# Patient Record
Sex: FEMALE | Race: WHITE | HISPANIC OR LATINO | Employment: FULL TIME
[De-identification: names, ages, dates, MRNs, and addresses within clinical notes are randomized per-mention and may not be internally consistent; named-entity substitution may affect disease eponyms.]

---

## 2023-10-22 ENCOUNTER — HEALTH MAINTENANCE LETTER (OUTPATIENT)
Age: 23
End: 2023-10-22

## 2023-10-22 ENCOUNTER — HOSPITAL ENCOUNTER (EMERGENCY)
Facility: CLINIC | Age: 23
Discharge: HOME | End: 2023-10-22
Attending: STUDENT IN AN ORGANIZED HEALTH CARE EDUCATION/TRAINING PROGRAM | Admitting: STUDENT IN AN ORGANIZED HEALTH CARE EDUCATION/TRAINING PROGRAM
Payer: COMMERCIAL

## 2023-10-22 VITALS
HEIGHT: 62 IN | DIASTOLIC BLOOD PRESSURE: 70 MMHG | BODY MASS INDEX: 39.75 KG/M2 | RESPIRATION RATE: 20 BRPM | SYSTOLIC BLOOD PRESSURE: 116 MMHG | WEIGHT: 216 LBS | HEART RATE: 79 BPM | TEMPERATURE: 98.6 F | OXYGEN SATURATION: 100 %

## 2023-10-22 DIAGNOSIS — R55 SYNCOPE, UNSPECIFIED SYNCOPE TYPE: ICD-10-CM

## 2023-10-22 LAB
ANION GAP SERPL CALCULATED.3IONS-SCNC: 9 MMOL/L (ref 7–15)
ATRIAL RATE - MUSE: 79 BPM
BASO+EOS+MONOS # BLD AUTO: ABNORMAL 10*3/UL
BASO+EOS+MONOS NFR BLD AUTO: ABNORMAL %
BASOPHILS # BLD AUTO: 0 10E3/UL (ref 0–0.2)
BASOPHILS NFR BLD AUTO: 0 %
BUN SERPL-MCNC: 9.9 MG/DL (ref 6–20)
CALCIUM SERPL-MCNC: 9.6 MG/DL (ref 8.6–10)
CHLORIDE SERPL-SCNC: 104 MMOL/L (ref 98–107)
CREAT SERPL-MCNC: 0.54 MG/DL (ref 0.51–0.95)
DEPRECATED HCO3 PLAS-SCNC: 24 MMOL/L (ref 22–29)
DIASTOLIC BLOOD PRESSURE - MUSE: 103 MMHG
EGFRCR SERPLBLD CKD-EPI 2021: >90 ML/MIN/1.73M2
EOSINOPHIL # BLD AUTO: 0.3 10E3/UL (ref 0–0.7)
EOSINOPHIL NFR BLD AUTO: 3 %
ERYTHROCYTE [DISTWIDTH] IN BLOOD BY AUTOMATED COUNT: 18.7 % (ref 10–15)
GLUCOSE SERPL-MCNC: 108 MG/DL (ref 70–99)
HCT VFR BLD AUTO: 38 % (ref 35–47)
HGB BLD-MCNC: 11.9 G/DL (ref 11.7–15.7)
IMM GRANULOCYTES # BLD: 0.1 10E3/UL
IMM GRANULOCYTES NFR BLD: 1 %
INTERPRETATION ECG - MUSE: NORMAL
LYMPHOCYTES # BLD AUTO: 1.4 10E3/UL (ref 0.8–5.3)
LYMPHOCYTES NFR BLD AUTO: 14 %
MCH RBC QN AUTO: 21.7 PG (ref 26.5–33)
MCHC RBC AUTO-ENTMCNC: 31.3 G/DL (ref 31.5–36.5)
MCV RBC AUTO: 69 FL (ref 78–100)
MONOCYTES # BLD AUTO: 0.8 10E3/UL (ref 0–1.3)
MONOCYTES NFR BLD AUTO: 7 %
NEUTROPHILS # BLD AUTO: 7.5 10E3/UL (ref 1.6–8.3)
NEUTROPHILS NFR BLD AUTO: 75 %
NRBC # BLD AUTO: 0 10E3/UL
NRBC BLD AUTO-RTO: 0 /100
P AXIS - MUSE: 17 DEGREES
PLATELET # BLD AUTO: 357 10E3/UL (ref 150–450)
POTASSIUM SERPL-SCNC: 4 MMOL/L (ref 3.4–5.3)
PR INTERVAL - MUSE: 144 MS
QRS DURATION - MUSE: 84 MS
QT - MUSE: 370 MS
QTC - MUSE: 424 MS
R AXIS - MUSE: 36 DEGREES
RBC # BLD AUTO: 5.48 10E6/UL (ref 3.8–5.2)
SODIUM SERPL-SCNC: 137 MMOL/L (ref 135–145)
SYSTOLIC BLOOD PRESSURE - MUSE: 141 MMHG
T AXIS - MUSE: 28 DEGREES
TROPONIN T SERPL HS-MCNC: <6 NG/L
VENTRICULAR RATE- MUSE: 79 BPM
WBC # BLD AUTO: 10.1 10E3/UL (ref 4–11)

## 2023-10-22 PROCEDURE — 80048 BASIC METABOLIC PNL TOTAL CA: CPT | Performed by: STUDENT IN AN ORGANIZED HEALTH CARE EDUCATION/TRAINING PROGRAM

## 2023-10-22 PROCEDURE — 99284 EMERGENCY DEPT VISIT MOD MDM: CPT

## 2023-10-22 PROCEDURE — 36415 COLL VENOUS BLD VENIPUNCTURE: CPT | Performed by: STUDENT IN AN ORGANIZED HEALTH CARE EDUCATION/TRAINING PROGRAM

## 2023-10-22 PROCEDURE — 85025 COMPLETE CBC W/AUTO DIFF WBC: CPT | Performed by: STUDENT IN AN ORGANIZED HEALTH CARE EDUCATION/TRAINING PROGRAM

## 2023-10-22 PROCEDURE — 84484 ASSAY OF TROPONIN QUANT: CPT | Performed by: STUDENT IN AN ORGANIZED HEALTH CARE EDUCATION/TRAINING PROGRAM

## 2023-10-22 PROCEDURE — 93005 ELECTROCARDIOGRAM TRACING: CPT | Performed by: STUDENT IN AN ORGANIZED HEALTH CARE EDUCATION/TRAINING PROGRAM

## 2023-10-22 NOTE — DISCHARGE INSTRUCTIONS
Your blood work today was reassuring.  EKG looked normal.  Suspect your fainting event was from stress/anxiety.    We also placed an electronic referral for the rapid access clinic which is our cardiology clinic.  I would like you to see them because of the multiple episodes of fainting.    Return for any fainting episodes, worsening symptoms or any other concerning symptoms.

## 2023-10-22 NOTE — ED NOTES
Patient discharged to home at this time.  Discharge instructions reviewed and provided to patient.  All instructions reviewed and questions answered.  Medication education provided for all new medications.  Patient stable upon discharge.  Regina Bolanos RN on 10/22/2023 at 2:45 AM

## 2023-10-22 NOTE — ED PROVIDER NOTES
Emergency Department Encounter         FINAL IMPRESSION:  Syncope        ED COURSE AND MEDICAL DECISION MAKING       ED Course as of 10/22/23 0244   Sun Oct 22, 2023   0200 I met with the patient, obtained history, performed physical exam, and discussed ED plan.   0209 Obese 23-year-old history of low iron per patient, here after syncopal event.  Patient states he was arguing with her  tonight and on the couch she started feeling panicked, stressed and then had a fainting spell.  Patient states it is happened before, and was told she had low iron.  Has been doing well this week otherwise.  States her menstrual cycle has been heavier than normal.  Intermittent abdominal cramping.  No chest pain, headache or shortness of breath or palpitations preceding the event.  No fevers chills nausea vomiting.  No tongue biting, seizure activity or bowel or bladder incontinence.  On arrival here she looks well.  Vitals are stable.  Heart and lungs are normal.  Neurologically grossly intact.  Patient really has no complaints right now.  I suspect vasovagal syncope.  Will obtain basic labs and EKG and if normal slightly discharge home.   0213 EKG is sinus rhythm 79, no signs of acute ischemia, no inversions no depressions no STEMI criteria, no malignant arrhythmias including ARVD, WPW, Brugada, QTc is 424.  No old EKGs for comparison.   0226 If patient's labs and work-up are reasonable, we will have her follow-up as an outpatient with rapid access for most likely Holter monitor placement.     -Labs reassuring.  EKG as above.  Plan for discharge home                     Medical Decision Making    History:  Supplemental history from: Documented in chart, if applicable  External Record(s) reviewed: Documented in chart, if applicable.    Work Up:  Chart documentation includes differential considered and any EKGs or imaging independently interpreted by provider, where specified.  In additional to work up documented, I  considered the following work up: Documented in chart, if applicable.    External consultation:  Discussion of management with another provider: Documented in chart, if applicable    Complicating factors:  Care impacted by chronic illness: N/A  Care affected by social determinants of health: N/A    Disposition considerations: Discharge. No recommendations on prescription strength medication(s). See documentation for any additional details.                Critical Care     Performed by: Jamal Marie or    Authorized by: Jamal Marie  Total critical care time:  minutes  Critical care was necessary to treat or prevent imminent or life-threatening deterioration of the following conditions:   Critical care was time spent personally by me on the following activities: development of treatment plan with patient or surrogate, discussions with consultants, examination of patient, evaluation of patient's response to treatment, obtaining history from patient or surrogate, ordering and performing treatments and interventions, ordering and review of laboratory studies, ordering and review of radiographic studies, re-evaluation of patient's condition and monitoring for potential decompensation.  Critical care time was exclusive of separately billable procedures and treating other patients.'    At the conclusion of the encounter I discussed the results of all the tests and the disposition. The questions were answered. The patient or family acknowledged understanding and was agreeable with the care plan.        MEDICATIONS GIVEN IN THE EMERGENCY DEPARTMENT:  Medications - No data to display    NEW PRESCRIPTIONS STARTED AT TODAY'S ED VISIT:  New Prescriptions    No medications on file       HPI     Patient information obtained from: patient    Use of : N/A    Angiekj Buenrostro is a 23 year old female with no pertinent history who presents to this ED via walk-in for evaluation of syncope. At around 2100, the patient was  "arguing with her  on the couch. She was feeling stressed, panicked, felt chest tightness, and she had a syncopal episode for 5 minutes. This has been happening once per month for the past 3 months usually when the patient is on her period. The last time this happened, the patient went to the ED and they said that she had low iron. She took the medications they gave her but they did not help. The patient is currently on her menstrual cycle and she is bleeding more than last month. She also has abdominal pain, cramps, and a mild headache. Denies biting tongue, chest pain, shortness of breath, palpitations, urinary or stool incontinence, or any other complaints at this time.          MEDICAL HISTORY     No past medical history on file.    No past surgical history on file.         No current outpatient medications on file.          PHYSICAL EXAM     BP (!) 152/101   Pulse 90   Temp 98.6  F (37  C) (Oral)   Resp 20   Ht 1.575 m (5' 2\")   Wt 98 kg (216 lb)   LMP 10/21/2023   SpO2 99%   BMI 39.51 kg/m        PHYSICAL EXAM:     General: Patient appears well, nontoxic, comfortable  HEENT: Moist mucous membranes,  No head trauma.    Cardiovascular: Normal rate, normal rhythm, no extremity edema.  No appreciable murmur.  Respiratory: No signs of respiratory distress, lungs are clear to auscultation bilaterally with no wheezes rhonchi or rales.  Abdominal: Soft, nontender, nondistended, no palpable masses, no guarding, no rebound  Musculoskeletal: Full range of motion of joints, no deformities appreciated.  Neurological: Alert and oriented, grossly neurologically intact.  Psychological: Normal affect and mood.  Integument: No rashes appreciated          RESULTS       Labs Ordered and Resulted from Time of ED Arrival to Time of ED Departure - No data to display    No orders to display         PROCEDURES:  Procedures:  Procedures       I, Star Garber am serving as a scribe to document services personally performed " by Jamal Marie DO, based on my observations and the provider's statements to me.  I, Jamal Marie DO, attest that Star Garber is acting in a scribe capacity, has observed my performance of the services and has documented them in accordance with my direction.    Jamal Marie DO  Emergency Medicine  [facility] EMERGENCY ROOM       Cynthia, Jamal Delacruz DO  10/22/23 0246

## 2023-10-22 NOTE — ED TRIAGE NOTES
Around 2100 tonight, pt was seated on the couch and was arguing with her  and felt stressed and panicked. Pt had syncopal episode for a few minutes. Pt is on her menstrual cycles and states she has heavy flow normally on her cycle with previous syncopal episodes. Was seen previously in the ED for this and was told she had low iron and was given supplement to take.      Triage Assessment (Adult)       Row Name 10/22/23 0200          Triage Assessment    Airway WDL WDL        Respiratory WDL    Respiratory WDL WDL        Skin Circulation/Temperature WDL    Skin Circulation/Temperature WDL WDL        Cardiac WDL    Cardiac WDL WDL        Peripheral/Neurovascular WDL    Peripheral Neurovascular WDL WDL        Cognitive/Neuro/Behavioral WDL    Cognitive/Neuro/Behavioral WDL WDL

## 2023-11-03 ENCOUNTER — OFFICE VISIT (OUTPATIENT)
Dept: CARDIOLOGY | Facility: CLINIC | Age: 23
End: 2023-11-03
Attending: STUDENT IN AN ORGANIZED HEALTH CARE EDUCATION/TRAINING PROGRAM
Payer: COMMERCIAL

## 2023-11-03 VITALS
HEART RATE: 109 BPM | HEIGHT: 62 IN | DIASTOLIC BLOOD PRESSURE: 84 MMHG | RESPIRATION RATE: 20 BRPM | SYSTOLIC BLOOD PRESSURE: 122 MMHG | WEIGHT: 222 LBS | OXYGEN SATURATION: 98 % | BODY MASS INDEX: 40.85 KG/M2

## 2023-11-03 DIAGNOSIS — R55 SYNCOPE, UNSPECIFIED SYNCOPE TYPE: ICD-10-CM

## 2023-11-03 PROCEDURE — 99204 OFFICE O/P NEW MOD 45 MIN: CPT | Performed by: INTERNAL MEDICINE

## 2023-11-03 RX ORDER — FERROUS SULFATE 325(65) MG
1 TABLET ORAL 2 TIMES DAILY
COMMUNITY
Start: 2023-08-20

## 2023-11-03 NOTE — PATIENT INSTRUCTIONS
It is nice to meet you today!    As discussed, let's get a bit more of a work up with an echo and a Holter monitor. If both are unremarkable, as long as you're feeling better, may not need further w/up    Discussed need for preventative measures, including adequate hydration, allowing time for equilibration w/ positional changes

## 2023-11-03 NOTE — PROGRESS NOTES
HEART CARE ENCOUNTER CONSULTATON NOTE      [facility]  [phone]      Assessment/Recommendations   Assessment/Plan: 23F w/ no prior medical history here for w/up after an ED visit for syncope.    # Syncope - presumed neurocardiogenic (I.e. vasovagal) but given high risk presentation requiring ED evaluation, unclear prodrome  would like to do a work up with an echo and a Holter monitor. If both are unremarkable, as long as she is feeling better, may not need further w/up  - discussed need for preventative measures, including adequate hydration, allowing time for equilibration w/ positional changes        History of Present Illness/Subjective    HPI: Angie Buenrostro is a 23 year old female w/ no prior medical history here for w/up after an ED visit for syncope.    She was seen about a week ago after a recurrent syncopal episode while arguing w/ her . She tells me she felt sweaty, anxious, w/ some chest discomfort, and nausea. She was able to walk over to the couch and sit down, and according to her  she was otu for ~5 mins. She has had similar events a few times over the past 3 mos, always w/ a prodrome, and always when she is on her period.  EKG in ED w/ NSR at 79, no acute ischemic changes. Labs largely significant for mild anemia H/H 11/9/38 and microcytosis; she is on iron supplementation for iron deficiency anemia.   She feels that she gets some anxiety attacks w/ L side pain and SOB occasionally.  No CP/SOB/REYNOSO/orthopnea/PND/edema/N/V/D/F/C/wt.changes.    , not a smoker or drinker, works for [employer].    Recent Echocardiogram Results:  None    Recent Coronary Angiogram Results:  None       Physical Examination  Review of Systems   Vitals: LMP 10/21/2023   BMI= There is no height or weight on file to calculate BMI.  Wt Readings from Last 3 Encounters:   10/22/23 98 kg (216 lb)       General Appearance:   no distress, normal body habitus   ENT/Mouth:  membranes moist, no oral lesions or bleeding gums.      EYES:  no scleral icterus, normal conjunctivae   Neck: no carotid bruits or thyromegaly   Chest/Lungs:   lungs are clear to auscultation, no rales or wheezing, no sternal scar, equal chest wall expansion    Cardiovascular:   Regular. Normal first and second heart sounds with no murmurs, rubs, or gallops; the carotid, radial and posterior tibial pulses are intact, Jugular venous pressure 6, no edema bilaterally    Abdomen:  no organomegaly, masses, bruits, or tenderness; bowel sounds are present   Extremities: no cyanosis or clubbing   Skin: no xanthelasma, warm.    Neurologic: normal  bilateral, no tremors     Psychiatric: alert and oriented x3, calm        Please refer above for cardiac ROS details.        Medical History  Surgical History Family History Social History   No past medical history on file.  No past surgical history on file.  No family history on file.     Social History     Socioeconomic History    Marital status: Single     Spouse name: Not on file    Number of children: Not on file    Years of education: Not on file    Highest education level: Not on file   Occupational History    Not on file   Tobacco Use    Smoking status: Not on file    Smokeless tobacco: Not on file   Substance and Sexual Activity    Alcohol use: Not on file    Drug use: Not on file    Sexual activity: Not on file   Other Topics Concern    Not on file   Social History Narrative    Not on file     Social Determinants of Health     Financial Resource Strain: Not on file   Food Insecurity: Not on file   Transportation Needs: Not on file   Physical Activity: Not on file   Stress: Not on file   Social Connections: Not on file   Interpersonal Safety: Not on file   Housing Stability: Not on file           Medications  Allergies   No current outpatient medications on file.     No Known Allergies       Lab Results    Chemistry/lipid CBC Cardiac Enzymes/BNP/TSH/INR   No results  "for input(s): \"CHOL\", \"HDL\", \"LDL\", \"TRIG\", \"CHOLHDLRATIO\" in the last 00744 hours.  No results for input(s): \"LDL\" in the last 94835 hours.  Recent Labs   Lab Test 10/22/23  0213      POTASSIUM 4.0   CHLORIDE 104   CO2 24   *   BUN 9.9   CR 0.54   GFRESTIMATED >90   LUCRECIA 9.6     Recent Labs   Lab Test 10/22/23  0213   CR 0.54     No results for input(s): \"A1C\" in the last 30798 hours.       Recent Labs   Lab Test 10/22/23  0213   WBC 10.1   HGB 11.9   HCT 38.0   MCV 69*        Recent Labs   Lab Test 10/22/23  0213   HGB 11.9    No results for input(s): \"TROPONINI\" in the last 29558 hours.  No results for input(s): \"BNP\", \"NTBNPI\", \"NTBNP\" in the last 83656 hours.  No results for input(s): \"TSH\" in the last 44578 hours.  No results for input(s): \"INR\" in the last 82055 hours.     Dave Lyons MD                                      "

## 2023-11-03 NOTE — LETTER
11/3/2023    Physician No Ref-Primary  No address on file    RE: Angie Buenrostro       Dear Colleague,     [facility] the [facility].    HEART CARE ENCOUNTER CONSULTATON NOTE      [facility]  [phone]      Assessment/Recommendations   Assessment/Plan: 23F w/ no prior medical history here for w/up after an ED visit for syncope.    # Syncope - presumed neurocardiogenic (I.e. vasovagal) but given high risk presentation requiring ED evaluation, unclear prodrome  would like to do a work up with an echo and a Holter monitor. If both are unremarkable, as long as she is feeling better, may not need further w/up  - discussed need for preventative measures, including adequate hydration, allowing time for equilibration w/ positional changes        History of Present Illness/Subjective    HPI: Angie Buenrostro is a 23 year old female w/ no prior medical history here for w/up after an ED visit for syncope.    She was seen about a week ago after a recurrent syncopal episode while arguing w/ her . She tells me she felt sweaty, anxious, w/ some chest discomfort, and nausea. She was able to walk over to the couch and sit down, and according to her  she was otu for ~5 mins. She has had similar events a few times over the past 3 mos, always w/ a prodrome, and always when she is on her period.  EKG in ED w/ NSR at 79, no acute ischemic changes. Labs largely significant for mild anemia H/H 11/9/38 and microcytosis; she is on iron supplementation for iron deficiency anemia.   She feels that she gets some anxiety attacks w/ L side pain and SOB occasionally.  No CP/SOB/REYNOSO/orthopnea/PND/edema/N/V/D/F/C/wt.changes.    , not a smoker or drinker, works for [employer].    Recent Echocardiogram Results:  None    Recent Coronary Angiogram Results:  None       Physical Examination  Review of Systems   Vitals:  LMP 10/21/2023   BMI= There is no height or weight on file to calculate BMI.  Wt Readings from Last 3 Encounters:   10/22/23 98 kg (216 lb)       General Appearance:   no distress, normal body habitus   ENT/Mouth: membranes moist, no oral lesions or bleeding gums.      EYES:  no scleral icterus, normal conjunctivae   Neck: no carotid bruits or thyromegaly   Chest/Lungs:   lungs are clear to auscultation, no rales or wheezing, no sternal scar, equal chest wall expansion    Cardiovascular:   Regular. Normal first and second heart sounds with no murmurs, rubs, or gallops; the carotid, radial and posterior tibial pulses are intact, Jugular venous pressure 6, no edema bilaterally    Abdomen:  no organomegaly, masses, bruits, or tenderness; bowel sounds are present   Extremities: no cyanosis or clubbing   Skin: no xanthelasma, warm.    Neurologic: normal  bilateral, no tremors     Psychiatric: alert and oriented x3, calm        Please refer above for cardiac ROS details.        Medical History  Surgical History Family History Social History   No past medical history on file.  No past surgical history on file.  No family history on file.     Social History     Socioeconomic History    Marital status: Single     Spouse name: Not on file    Number of children: Not on file    Years of education: Not on file    Highest education level: Not on file   Occupational History    Not on file   Tobacco Use    Smoking status: Not on file    Smokeless tobacco: Not on file   Substance and Sexual Activity    Alcohol use: Not on file    Drug use: Not on file    Sexual activity: Not on file   Other Topics Concern    Not on file   Social History Narrative    Not on file     Social Determinants of Health     Financial Resource Strain: Not on file   Food Insecurity: Not on file   Transportation Needs: Not on file   Physical Activity: Not on file   Stress: Not on file   Social Connections: Not on file   Interpersonal Safety: Not on file  "  Housing Stability: Not on file           Medications  Allergies   No current outpatient medications on file.     No Known Allergies       Lab Results    Chemistry/lipid CBC Cardiac Enzymes/BNP/TSH/INR   No results for input(s): \"CHOL\", \"HDL\", \"LDL\", \"TRIG\", \"CHOLHDLRATIO\" in the last 02200 hours.  No results for input(s): \"LDL\" in the last 37549 hours.  Recent Labs   Lab Test 10/22/23  0213      POTASSIUM 4.0   CHLORIDE 104   CO2 24   *   BUN 9.9   CR 0.54   GFRESTIMATED >90   LUCRECIA 9.6     Recent Labs   Lab Test 10/22/23  0213   CR 0.54     No results for input(s): \"A1C\" in the last 49856 hours.       Recent Labs   Lab Test 10/22/23  0213   WBC 10.1   HGB 11.9   HCT 38.0   MCV 69*        Recent Labs   Lab Test 10/22/23  0213   HGB 11.9    No results for input(s): \"TROPONINI\" in the last 71137 hours.  No results for input(s): \"BNP\", \"NTBNPI\", \"NTBNP\" in the last 76946 hours.  No results for input(s): \"TSH\" in the last 41610 hours.  No results for input(s): \"INR\" in the last 29088 hours.     Dave Lyons MD        Thank you for allowing me to participate in the care of your patient.      Sincerely,     Dave Lyons MD     [facility]  cc:   Jamal Delacruz Oh, DO  [facility]  [address] [place] [address]      "

## 2024-05-15 ENCOUNTER — OFFICE VISIT (OUTPATIENT)
Dept: FAMILY MEDICINE | Facility: CLINIC | Age: 24
End: 2024-05-15
Payer: COMMERCIAL

## 2024-05-15 VITALS
OXYGEN SATURATION: 99 % | HEART RATE: 96 BPM | DIASTOLIC BLOOD PRESSURE: 82 MMHG | SYSTOLIC BLOOD PRESSURE: 124 MMHG | TEMPERATURE: 98.9 F | RESPIRATION RATE: 16 BRPM

## 2024-05-15 DIAGNOSIS — R07.0 THROAT PAIN: ICD-10-CM

## 2024-05-15 DIAGNOSIS — H66.001 NON-RECURRENT ACUTE SUPPURATIVE OTITIS MEDIA OF RIGHT EAR WITHOUT SPONTANEOUS RUPTURE OF TYMPANIC MEMBRANE: Primary | ICD-10-CM

## 2024-05-15 LAB
DEPRECATED S PYO AG THROAT QL EIA: NEGATIVE
GROUP A STREP BY PCR: NOT DETECTED

## 2024-05-15 PROCEDURE — 87651 STREP A DNA AMP PROBE: CPT

## 2024-05-15 PROCEDURE — 99203 OFFICE O/P NEW LOW 30 MIN: CPT

## 2024-05-15 NOTE — LETTER
May 15, 2024      Angie Segundo Buenrostro  [address] [place] [address]        To Whom It May Concern:    Angie Segundo Buenrostro  was seen on 5/15/24.  Please excuse her from work on May 15 and 16, 2024 due to illness.        Sincerely,      Parul Vazquez, [facility].

## 2024-05-15 NOTE — PROGRESS NOTES
Assessment & Plan     Throat pain  Rapid strep negative  - Streptococcus A Rapid Screen w/Reflex to PCR    Non-recurrent acute suppurative otitis media of right ear without spontaneous rupture of tympanic membrane    - amoxicillin-clavulanate (AUGMENTIN) 875-125 MG tablet; Take 1 tablet by mouth 2 times daily for 7 days          Return in about 1 week (around 5/22/2024), or if symptoms worsen or fail to improve.    William Adams is a 23 year old, presenting for the following health issues:  Otalgia (R ear pain and headaches, congestion x 1 week )    HPI   Presents with 2 week hx of sore throat, headache, nasal congestion and now right ear pain. Using IBU for sx management. No ill contacts. Fever yesterday of 100.6      Review of Systems  Constitutional, HEENT, cardiovascular, pulmonary, gi and gu systems are negative, except as otherwise noted.      Objective    /82   Pulse 96   Temp 98.9  F (37.2  C) (Tympanic)   Resp 16   SpO2 99%   There is no height or weight on file to calculate BMI.  Physical Exam   GENERAL: alert and no distress  EYES: Eyes grossly normal to inspection, PERRL and conjunctivae and sclerae normal  HENT: normal cephalic/atraumatic, right ear: erythematous, left ear: normal: no effusions, no erythema, normal landmarks, nose and mouth without ulcers or lesions, oral mucous membranes moist, and tonsillar hypertrophy  NECK: bilateral anterior cervical adenopathy  RESP: lungs clear to auscultation - no rales, rhonchi or wheezes            Signed Electronically by: [facility]

## 2024-06-11 ENCOUNTER — LAB REQUISITION (OUTPATIENT)
Dept: LAB | Facility: CLINIC | Age: 24
End: 2024-06-11

## 2024-06-11 DIAGNOSIS — Z12.4 ENCOUNTER FOR SCREENING FOR MALIGNANT NEOPLASM OF CERVIX: ICD-10-CM

## 2024-06-11 DIAGNOSIS — Z11.3 ENCOUNTER FOR SCREENING FOR INFECTIONS WITH A PREDOMINANTLY SEXUAL MODE OF TRANSMISSION: ICD-10-CM

## 2024-06-11 PROCEDURE — 87389 HIV-1 AG W/HIV-1&-2 AB AG IA: CPT | Performed by: NURSE PRACTITIONER

## 2024-06-11 PROCEDURE — 87340 HEPATITIS B SURFACE AG IA: CPT | Performed by: NURSE PRACTITIONER

## 2024-06-11 PROCEDURE — 86803 HEPATITIS C AB TEST: CPT | Performed by: NURSE PRACTITIONER

## 2024-06-11 PROCEDURE — G0145 SCR C/V CYTO,THINLAYER,RESCR: HCPCS | Performed by: NURSE PRACTITIONER

## 2024-06-11 PROCEDURE — 87491 CHLMYD TRACH DNA AMP PROBE: CPT | Performed by: NURSE PRACTITIONER

## 2024-06-11 PROCEDURE — 86780 TREPONEMA PALLIDUM: CPT | Performed by: NURSE PRACTITIONER

## 2024-06-12 LAB
C TRACH DNA SPEC QL PROBE+SIG AMP: NEGATIVE
HIV 1+2 AB+HIV1 P24 AG SERPL QL IA: NONREACTIVE
N GONORRHOEA DNA SPEC QL NAA+PROBE: NEGATIVE

## 2024-06-13 LAB
HBV SURFACE AG SERPL QL IA: NONREACTIVE
HCV AB SERPL QL IA: NONREACTIVE
T PALLIDUM AB SER QL: NONREACTIVE

## 2024-06-14 LAB
BKR LAB AP GYN ADEQUACY: NORMAL
BKR LAB AP GYN INTERPRETATION: NORMAL
BKR LAB AP HPV REFLEX: NORMAL
BKR LAB AP LMP: NORMAL
BKR LAB AP PREVIOUS ABNL DX: NORMAL
BKR LAB AP PREVIOUS ABNORMAL: NORMAL
PATH REPORT.COMMENTS IMP SPEC: NORMAL
PATH REPORT.COMMENTS IMP SPEC: NORMAL
PATH REPORT.RELEVANT HX SPEC: NORMAL

## 2024-12-15 ENCOUNTER — HEALTH MAINTENANCE LETTER (OUTPATIENT)
Age: 24
End: 2024-12-15

## 2025-05-23 ENCOUNTER — HOSPITAL ENCOUNTER (EMERGENCY)
Facility: CLINIC | Age: 25
Discharge: HOME | End: 2025-05-23
Admitting: STUDENT IN AN ORGANIZED HEALTH CARE EDUCATION/TRAINING PROGRAM
Payer: COMMERCIAL

## 2025-05-23 VITALS
BODY MASS INDEX: 36.82 KG/M2 | SYSTOLIC BLOOD PRESSURE: 132 MMHG | HEIGHT: 65 IN | RESPIRATION RATE: 18 BRPM | HEART RATE: 97 BPM | OXYGEN SATURATION: 99 % | DIASTOLIC BLOOD PRESSURE: 73 MMHG | WEIGHT: 221 LBS | TEMPERATURE: 97.9 F

## 2025-05-23 DIAGNOSIS — N93.9 VAGINAL BLEEDING: ICD-10-CM

## 2025-05-23 LAB
ABO + RH BLD: NORMAL
ALBUMIN SERPL BCG-MCNC: 4.4 G/DL (ref 3.5–5.2)
ALBUMIN UR-MCNC: 30 MG/DL
ALP SERPL-CCNC: 95 U/L (ref 40–150)
ALT SERPL W P-5'-P-CCNC: 21 U/L (ref 0–50)
ANION GAP SERPL CALCULATED.3IONS-SCNC: 12 MMOL/L (ref 7–15)
APPEARANCE UR: ABNORMAL
AST SERPL W P-5'-P-CCNC: 24 U/L (ref 0–45)
BACTERIA #/AREA URNS HPF: ABNORMAL /HPF
BASOPHILS # BLD AUTO: 0 10E3/UL (ref 0–0.2)
BASOPHILS NFR BLD AUTO: 0 %
BILIRUB SERPL-MCNC: <0.2 MG/DL
BILIRUB UR QL STRIP: NEGATIVE
BLD GP AB SCN SERPL QL: NEGATIVE
BUN SERPL-MCNC: 9.3 MG/DL (ref 6–20)
CALCIUM SERPL-MCNC: 9.7 MG/DL (ref 8.8–10.4)
CHLORIDE SERPL-SCNC: 105 MMOL/L (ref 98–107)
COLOR UR AUTO: ABNORMAL
CREAT SERPL-MCNC: 0.52 MG/DL (ref 0.51–0.95)
EGFRCR SERPLBLD CKD-EPI 2021: >90 ML/MIN/1.73M2
EOSINOPHIL # BLD AUTO: 0.2 10E3/UL (ref 0–0.7)
EOSINOPHIL NFR BLD AUTO: 2 %
ERYTHROCYTE [DISTWIDTH] IN BLOOD BY AUTOMATED COUNT: 17.7 % (ref 10–15)
GLUCOSE SERPL-MCNC: 95 MG/DL (ref 70–99)
GLUCOSE UR STRIP-MCNC: NEGATIVE MG/DL
HCG INTACT+B SERPL-ACNC: <1 MIU/ML
HCO3 SERPL-SCNC: 23 MMOL/L (ref 22–29)
HCT VFR BLD AUTO: 40.1 % (ref 35–47)
HGB BLD-MCNC: 12.2 G/DL (ref 11.7–15.7)
HGB UR QL STRIP: ABNORMAL
HYALINE CASTS: 1 /LPF
IMM GRANULOCYTES # BLD: 0 10E3/UL
IMM GRANULOCYTES NFR BLD: 0 %
KETONES UR STRIP-MCNC: NEGATIVE MG/DL
LEUKOCYTE ESTERASE UR QL STRIP: NEGATIVE
LYMPHOCYTES # BLD AUTO: 1.2 10E3/UL (ref 0.8–5.3)
LYMPHOCYTES NFR BLD AUTO: 12 %
MCH RBC QN AUTO: 21.1 PG (ref 26.5–33)
MCHC RBC AUTO-ENTMCNC: 30.4 G/DL (ref 31.5–36.5)
MCV RBC AUTO: 70 FL (ref 78–100)
MONOCYTES # BLD AUTO: 0.7 10E3/UL (ref 0–1.3)
MONOCYTES NFR BLD AUTO: 7 %
MUCOUS THREADS #/AREA URNS LPF: PRESENT /LPF
NEUTROPHILS # BLD AUTO: 8.3 10E3/UL (ref 1.6–8.3)
NEUTROPHILS NFR BLD AUTO: 79 %
NITRATE UR QL: NEGATIVE
NRBC # BLD AUTO: 0 10E3/UL
NRBC BLD AUTO-RTO: 0 /100
PH UR STRIP: 5 [PH] (ref 5–7)
PLATELET # BLD AUTO: 379 10E3/UL (ref 150–450)
POTASSIUM SERPL-SCNC: 3.9 MMOL/L (ref 3.4–5.3)
PROT SERPL-MCNC: 8.2 G/DL (ref 6.4–8.3)
RBC # BLD AUTO: 5.77 10E6/UL (ref 3.8–5.2)
RBC URINE: 3 /HPF
SODIUM SERPL-SCNC: 140 MMOL/L (ref 135–145)
SP GR UR STRIP: 1.03 (ref 1–1.03)
SPECIMEN EXP DATE BLD: NORMAL
SQUAMOUS EPITHELIAL: 8 /HPF
UROBILINOGEN UR STRIP-MCNC: NORMAL MG/DL
WBC # BLD AUTO: 10.5 10E3/UL (ref 4–11)
WBC URINE: 5 /HPF

## 2025-05-23 PROCEDURE — 36415 COLL VENOUS BLD VENIPUNCTURE: CPT | Performed by: STUDENT IN AN ORGANIZED HEALTH CARE EDUCATION/TRAINING PROGRAM

## 2025-05-23 PROCEDURE — 84702 CHORIONIC GONADOTROPIN TEST: CPT | Performed by: STUDENT IN AN ORGANIZED HEALTH CARE EDUCATION/TRAINING PROGRAM

## 2025-05-23 PROCEDURE — 80053 COMPREHEN METABOLIC PANEL: CPT | Performed by: STUDENT IN AN ORGANIZED HEALTH CARE EDUCATION/TRAINING PROGRAM

## 2025-05-23 PROCEDURE — 99283 EMERGENCY DEPT VISIT LOW MDM: CPT | Performed by: STUDENT IN AN ORGANIZED HEALTH CARE EDUCATION/TRAINING PROGRAM

## 2025-05-23 PROCEDURE — 250N000013 HC RX MED GY IP 250 OP 250 PS 637: Performed by: STUDENT IN AN ORGANIZED HEALTH CARE EDUCATION/TRAINING PROGRAM

## 2025-05-23 PROCEDURE — 86901 BLOOD TYPING SEROLOGIC RH(D): CPT | Performed by: STUDENT IN AN ORGANIZED HEALTH CARE EDUCATION/TRAINING PROGRAM

## 2025-05-23 PROCEDURE — 85014 HEMATOCRIT: CPT | Performed by: STUDENT IN AN ORGANIZED HEALTH CARE EDUCATION/TRAINING PROGRAM

## 2025-05-23 PROCEDURE — 81003 URINALYSIS AUTO W/O SCOPE: CPT | Performed by: STUDENT IN AN ORGANIZED HEALTH CARE EDUCATION/TRAINING PROGRAM

## 2025-05-23 RX ORDER — ACETAMINOPHEN 325 MG/1
650 TABLET ORAL ONCE
Status: COMPLETED | OUTPATIENT
Start: 2025-05-23 | End: 2025-05-23

## 2025-05-23 RX ADMIN — ACETAMINOPHEN 650 MG: 325 TABLET, FILM COATED ORAL at 21:39

## 2025-05-23 ASSESSMENT — COLUMBIA-SUICIDE SEVERITY RATING SCALE - C-SSRS
6. HAVE YOU EVER DONE ANYTHING, STARTED TO DO ANYTHING, OR PREPARED TO DO ANYTHING TO END YOUR LIFE?: NO
1. IN THE PAST MONTH, HAVE YOU WISHED YOU WERE DEAD OR WISHED YOU COULD GO TO SLEEP AND NOT WAKE UP?: NO
2. HAVE YOU ACTUALLY HAD ANY THOUGHTS OF KILLING YOURSELF IN THE PAST MONTH?: NO

## 2025-05-23 ASSESSMENT — ACTIVITIES OF DAILY LIVING (ADL)
ADLS_ACUITY_SCORE: 41

## 2025-05-24 NOTE — ED PROVIDER NOTES
"Emergency Department Encounter   NAME: Angie Segundo ; AGE: 24 year old female ; YOB: 2000 ; MRN: [id number] ; PCP: Onelia Milligan   ED PROVIDER: Madonna Hodgson PA-C    Evaluation Date & Time:   5/23/2025  7:40 PM    CHIEF COMPLAINT:  Vaginal Bleeding - Pregnant        Impression and Plan   FINAL IMPRESSION:    ICD-10-CM    1. Vaginal bleeding  N93.9           ED Course and Medical Decision Making  Angie Segundo is a 24 year old female with no PMH who presents to the ED for evaluation of vaginal bleeding.  Patient states she is currently 11 weeks pregnant.  Today while at the grocery store she states she developed lower abdominal cramping and vaginal bleeding. She has changed her pad once since the bleeding started this afternoon.  Patient states she had a positive pregnancy test at home 2 weeks ago.  Per chart, patient was evaluated in her family medicine clinic on 4/17/2025 and had a negative serum hCG at that time. She states she \"did not believe this negative result\" so that is why she is saying she is 11 weeks pregnant.    Vitals reviewed and unremarkable, /73. On exam she is resting comfortably, no acute distress. Differential diagnosis/emergent conditions considered and evaluated for includes but not limited to pregnancy, miscarriage, UTI, PID, STI, menstrual period. Her abdomen is soft and non tender. No CVA tenderness bilaterally.  Given patient's bleeding is a very minimal amount I do not think a emergent pelvic exam is indicated at this time.  Will obtain lab work and further evaluate need for pelvic.    Her serum hCG today is <1, pt is not pregnant.  CBC is without anemia or thrombocytopenia.  UA shows blood. We discussed performing ultrasound to evaluate for emergent causes for acute pelvic pain and bleeding.  Patient declined, she states she was primarily concerned that she was bleeding while pregnant. I suspect patient's slight tachycardia was due to pain and " "heightened anxiety.  After she received a dose of Tylenol here her abdominal cramping improved and pulse improved to 97. Pt did not get her period at the beginning of the month as scheduled so it is possible she may be starting to experience her menstrual period.  Patient states she has fairly irregular periods.  I educated patient that there are other causes for irregular periods in addition to pregnancy, including PCOS and hormonal causes.  I recommended patient follow-up with her OB/GYN clinic for further management of her irregular periods.  She was educated on concerning symptoms abnormal return to the ER and is understanding and agreeable to this plan.        Medical Decision Making  Discharge. No recommendations on prescription strength medication(s). See documentation for any additional details.    MIPS (CTPE, Dental pain, Brooks, Sinusitis, Asthma/COPD, Head Trauma): Not Applicable    SEPSIS: None        ED COURSE:  7:50 PM I met and introduced myself to the patient. I gathered initial history and performed my physical exam. We discussed plan for initial workup.   10:01 PM I rechecked the patient and discussed results, discharge, follow up, and reasons to return to the ED.     At the conclusion of the encounter I discussed the results of all the tests and the disposition. The questions were answered. The patient or family acknowledged understanding and was agreeable with the care plan.          MEDICATIONS GIVEN IN THE EMERGENCY DEPARTMENT:  Medications   acetaminophen (TYLENOL) tablet 650 mg (650 mg Oral $Given 5/23/25 213)         NEW PRESCRIPTIONS STARTED AT TODAY'S ED VISIT:  New Prescriptions    No medications on file         HPI     Angie Segundo is a 24 year old female with no pertinent medical history, who presents to the ED by walk-in for evaluation of abdominal cramping and vaginal bleeding during pregnancy.    The patient reports that she woke up this morning with \"really bad\" " "abdominal cramping, but was not having any vaginal bleeding at that time. She notes that earlier today while grocery shopping she developed lower abdominal cramping. About one hour later she developed vaginal bleeding. She only wore on period pad and placed one tampon in, and explains that she had to change her tampon because it became completely satured in blood after one hour.  This cramping comes and goes.    She took an at-home pregnancy test two weeks ago, and this test came back positive at that time. The patient reports that she is \"11 weeks pregnant\". Reports that she had a pregnancy test taken 11 weeks ago in clinic, and that test came back negative, however, the patient expresses that she did \"not believe\" that pregnancy test result, hence she believes she is 11 weeks pregnant now. She confirms that the positive pregnancy test she did 2 weeks ago was her first positive pregnancy test. Denies any nausea or having episodes of emesis. She is wearing a heating pad currently to help with the abdominal cramping.    Notes that she has no children. Denies having any history of miscarriages. She does have an OB/GYN clinic she goes to. She denies experiencing any urinary symptoms including any burning sensation when she urinates. She expresses having no concerns for STIs. No additional complaints or concerns reported at this time.    Physical Exam     First Vitals:  Patient Vitals for the past 24 hrs:   BP Temp Temp src Pulse Resp SpO2 Height Weight   05/23/25 2051 132/73 -- -- 97 -- 99 % -- --   05/23/25 2005 -- -- -- 104 -- 99 % -- --   05/23/25 2000 126/77 -- -- 109 18 100 % -- --   05/23/25 1945 127/78 -- -- 114 18 99 % 1.651 m (5' 5\") 100.2 kg (221 lb)   05/23/25 1936 (!) 140/88 97.9  F (36.6  C) Temporal 113 18 99 % -- 100.3 kg (221 lb 3.2 oz)         PHYSICAL EXAM    General Appearance:  Alert, cooperative, no distress, appears stated age  Respiratory: No distress.   Cardiovascular: Regular rate   GI: Abdomen " soft, nontender  : No CVA tenderness  Musculoskeletal: Moving all extremities. No gross deformities  Integument: Warm, dry, no rashes or lesions  Neurologic: Alert and orientated x3. No focal deficits.  Psych: Normal mood and affect        Results     LAB:  All pertinent labs reviewed and interpreted  Labs Ordered and Resulted from Time of ED Arrival to Time of ED Departure   ROUTINE UA WITH MICROSCOPIC REFLEX TO CULTURE - Abnormal       Result Value    Color Urine Light Yellow      Appearance Urine Turbid (*)     Glucose Urine Negative      Bilirubin Urine Negative      Ketones Urine Negative      Specific Gravity Urine 1.027      Blood Urine >1.0 mg/dL (*)     pH Urine 5.0      Protein Albumin Urine 30 (*)     Urobilinogen Urine Normal      Nitrite Urine Negative      Leukocyte Esterase Urine Negative      Bacteria Urine Few (*)     Mucus Urine Present (*)     RBC Urine 3 (*)     WBC Urine 5      Squamous Epithelials Urine 8 (*)     Hyaline Casts Urine 1     CBC WITH PLATELETS AND DIFFERENTIAL - Abnormal    WBC Count 10.5      RBC Count 5.77 (*)     Hemoglobin 12.2      Hematocrit 40.1      MCV 70 (*)     MCH 21.1 (*)     MCHC 30.4 (*)     RDW 17.7 (*)     Platelet Count 379      % Neutrophils 79      % Lymphocytes 12      % Monocytes 7      % Eosinophils 2      % Basophils 0      % Immature Granulocytes 0      NRBCs per 100 WBC 0      Absolute Neutrophils 8.3      Absolute Lymphocytes 1.2      Absolute Monocytes 0.7      Absolute Eosinophils 0.2      Absolute Basophils 0.0      Absolute Immature Granulocytes 0.0      Absolute NRBCs 0.0     HCG QUANTITATIVE PREGNANCY - Normal    hCG Quantitative <1     COMPREHENSIVE METABOLIC PANEL - Normal    Sodium 140      Potassium 3.9      Carbon Dioxide (CO2) 23      Anion Gap 12      Urea Nitrogen 9.3      Creatinine 0.52      GFR Estimate >90      Calcium 9.7      Chloride 105      Glucose 95      Alkaline Phosphatase 95      AST 24      ALT 21      Protein Total 8.2       Albumin 4.4      Bilirubin Total <0.2     TYPE AND SCREEN, ADULT    ABO/RH(D) O POS      Antibody Screen Negative      SPECIMEN EXPIRATION DATE [id number]     ABO/RH TYPE AND SCREEN       RADIOLOGY:  No orders to display         ECG:  N/A      PROCEDURES:  N/A      ICarina, am serving as a scribe to document services personally performed by Madonna Hodgson PA-C, based on my observation and the provider's statements to me. IMadonna PA-C attest that Carina Brand is acting in a scribe capacity, has observed my performance of the services and has documented them in accordance with my direction.       Madonna Hodgson PA-C   Emergency Medicine   [facility] EMERGENCY ROOM      Madonna Hodgson PA-C  05/24/25 0214

## 2025-05-24 NOTE — ED TRIAGE NOTES
Pt c/o vaginal bleeding that started today with abdomen cramping. Pt is about 11 weeks pregnant and noticed bright red blood from her vaginal area. She has not had an ultrasound done yet. Has had labs drawn.      Triage Assessment (Adult)       Row Name 05/23/25 1937          Triage Assessment    Airway WDL WDL        Respiratory WDL    Respiratory WDL WDL        Skin Circulation/Temperature WDL    Skin Circulation/Temperature WDL WDL        Cardiac WDL    Cardiac WDL WDL        Peripheral/Neurovascular WDL    Peripheral Neurovascular WDL WDL        Cognitive/Neuro/Behavioral WDL    Cognitive/Neuro/Behavioral WDL WDL

## 2025-05-24 NOTE — DISCHARGE INSTRUCTIONS
You were evaluated in the emergency department today for vaginal bleeding and concern for pregnancy.  Your blood pregnancy test today is negative. This was also negative on 4/17 when a blood pregnancy test was checked.  I suspect since you have irregular periods this may be your menstrual period coming a bit late.    I recommend calling your OB/GYN clinic to schedule a follow-up regarding your irregular periods.  If at any point you develop very heavy vaginal bleeding where you are soaking a pad or tampon every hour, become very lightheaded or dizzy, or develop any fevers or severe worsening abdominal pain return to the emergency department

## 2025-07-20 ENCOUNTER — HEALTH MAINTENANCE LETTER (OUTPATIENT)
Age: 25
End: 2025-07-20